# Patient Record
Sex: MALE | Race: OTHER | Employment: FULL TIME | URBAN - NONMETROPOLITAN AREA
[De-identification: names, ages, dates, MRNs, and addresses within clinical notes are randomized per-mention and may not be internally consistent; named-entity substitution may affect disease eponyms.]

---

## 2018-07-16 ENCOUNTER — HOSPITAL ENCOUNTER (OUTPATIENT)
Dept: LAB | Age: 68
Setting detail: SPECIMEN
Discharge: HOME | End: 2018-07-16
Payer: MEDICARE

## 2018-07-16 ENCOUNTER — OFFICE VISIT (OUTPATIENT)
Dept: FAMILY MEDICINE CLINIC | Age: 68
End: 2018-07-16
Payer: MEDICARE

## 2018-07-16 VITALS
WEIGHT: 169 LBS | OXYGEN SATURATION: 97 % | HEART RATE: 71 BPM | TEMPERATURE: 97.8 F | RESPIRATION RATE: 16 BRPM | SYSTOLIC BLOOD PRESSURE: 138 MMHG | BODY MASS INDEX: 24.25 KG/M2 | DIASTOLIC BLOOD PRESSURE: 80 MMHG

## 2018-07-16 DIAGNOSIS — E11.9 TYPE 2 DIABETES MELLITUS WITHOUT COMPLICATION, WITH LONG-TERM CURRENT USE OF INSULIN (HCC): Primary | ICD-10-CM

## 2018-07-16 DIAGNOSIS — Z79.4 TYPE 2 DIABETES MELLITUS WITHOUT COMPLICATION, WITH LONG-TERM CURRENT USE OF INSULIN (HCC): ICD-10-CM

## 2018-07-16 DIAGNOSIS — E11.9 TYPE 2 DIABETES MELLITUS WITHOUT COMPLICATION, WITH LONG-TERM CURRENT USE OF INSULIN (HCC): ICD-10-CM

## 2018-07-16 DIAGNOSIS — Z79.4 TYPE 2 DIABETES MELLITUS WITHOUT COMPLICATION, WITH LONG-TERM CURRENT USE OF INSULIN (HCC): Primary | ICD-10-CM

## 2018-07-16 LAB
BILIRUBIN, POC: NORMAL
BLOOD URINE, POC: NORMAL
CLARITY, POC: NORMAL
COLOR, POC: NORMAL
CREATININE URINE: 209.3 MG/DL (ref 39–259)
ESTIMATED AVERAGE GLUCOSE: 217 MG/DL
GLUCOSE URINE, POC: NORMAL
HBA1C MFR BLD: 9.2 % (ref 4.8–5.9)
KETONES, POC: NORMAL
LEUKOCYTE EST, POC: NORMAL
MICROALBUMIN/CREAT 24H UR: 315 MG/L
MICROALBUMIN/CREAT UR-RTO: 151 MCG/MG CREAT
NITRITE, POC: NORMAL
PH, POC: 6
PROTEIN, POC: NORMAL
SPECIFIC GRAVITY, POC: 1.02
UROBILINOGEN, POC: 0.2

## 2018-07-16 PROCEDURE — 82043 UR ALBUMIN QUANTITATIVE: CPT

## 2018-07-16 PROCEDURE — 1101F PT FALLS ASSESS-DOCD LE1/YR: CPT | Performed by: NURSE PRACTITIONER

## 2018-07-16 PROCEDURE — 36415 COLL VENOUS BLD VENIPUNCTURE: CPT

## 2018-07-16 PROCEDURE — 1123F ACP DISCUSS/DSCN MKR DOCD: CPT | Performed by: NURSE PRACTITIONER

## 2018-07-16 PROCEDURE — G8427 DOCREV CUR MEDS BY ELIG CLIN: HCPCS | Performed by: NURSE PRACTITIONER

## 2018-07-16 PROCEDURE — 4040F PNEUMOC VAC/ADMIN/RCVD: CPT | Performed by: NURSE PRACTITIONER

## 2018-07-16 PROCEDURE — 1036F TOBACCO NON-USER: CPT | Performed by: NURSE PRACTITIONER

## 2018-07-16 PROCEDURE — 81002 URINALYSIS NONAUTO W/O SCOPE: CPT | Performed by: NURSE PRACTITIONER

## 2018-07-16 PROCEDURE — G8420 CALC BMI NORM PARAMETERS: HCPCS | Performed by: NURSE PRACTITIONER

## 2018-07-16 PROCEDURE — 3017F COLORECTAL CA SCREEN DOC REV: CPT | Performed by: NURSE PRACTITIONER

## 2018-07-16 PROCEDURE — 2022F DILAT RTA XM EVC RTNOPTHY: CPT | Performed by: NURSE PRACTITIONER

## 2018-07-16 PROCEDURE — 83036 HEMOGLOBIN GLYCOSYLATED A1C: CPT

## 2018-07-16 PROCEDURE — 82570 ASSAY OF URINE CREATININE: CPT

## 2018-07-16 PROCEDURE — 99214 OFFICE O/P EST MOD 30 MIN: CPT | Performed by: NURSE PRACTITIONER

## 2018-07-16 PROCEDURE — 3046F HEMOGLOBIN A1C LEVEL >9.0%: CPT | Performed by: NURSE PRACTITIONER

## 2018-07-16 ASSESSMENT — ENCOUNTER SYMPTOMS
SHORTNESS OF BREATH: 0
VISUAL CHANGE: 0
CHEST TIGHTNESS: 0
BLURRED VISION: 0

## 2018-07-16 NOTE — PROGRESS NOTES
[address] [place] [address] In [address] [facility], APRN-CNP  [address]  Phone:  [phone]  Fax:  [phone]  Gretchen Montalvo is a 70 y.o. male who presents today for his medical conditions/complaints as noted below. Gretchen Montalvo c/o of Diabetes (pt says he has had some high sugars)      HPI:     Patient has limited [place] speaking ability. Able to communicate via translation of English text to [address], and my marginal Khmer speaking. No translation phone available at this time. Diabetes   He presents for his initial diabetic visit. He has type 2 diabetes mellitus. The initial diagnosis of diabetes was made 7 years ago. His disease course has been fluctuating (Has not been treated. ). Pertinent negatives for hypoglycemia include no confusion, headaches or seizures. Pertinent negatives for diabetes include no blurred vision, no chest pain, no fatigue, no foot paresthesias, no polydipsia, no polyphagia, no polyuria, no visual change, no weakness and no weight loss. Diabetic symptom progression: no treatment for the past 3 years. When asked about current treatments, none were reported. His weight is increasing steadily. When asked about meal planning, he reported none. His breakfast blood glucose range is generally >200 mg/dl. An ACE inhibitor/angiotensin II receptor blocker is not being taken.        Wt Readings from Last 3 Encounters:   07/16/18 169 lb (76.7 kg)   09/04/15 162 lb 12.8 oz (73.8 kg)   03/28/14 133 lb 6.4 oz (60.5 kg)       Temp Readings from Last 3 Encounters:   07/16/18 97.8 °F (36.6 °C) (Tympanic)   03/28/14 97.3 °F (36.3 °C) (Tympanic)   03/21/14 98.1 °F (36.7 °C) (Oral)       BP Readings from Last 3 Encounters:   07/16/18 138/80   09/04/15 138/84   03/28/14 126/60       Pulse Readings from Last 3 Encounters:   07/16/18 71   09/04/15 79   03/28/14 64              Past Medical History:   Diagnosis Date    Depression     ED (erectile dysfunction)     Elevated PSA     ESBL (extended spectrum beta-lactamase) producing bacteria infection 3/14/2014    blood and urine E. Coli    Hyperlipidemia     Hypogonadism male     Low testosterone     hx of    Mycosis     mild, feet    Proteinuria     mild    Type II or unspecified type diabetes mellitus without mention of complication, not stated as uncontrolled       Past Surgical History:   Procedure Laterality Date    HAND SURGERY Right     [facility]         PROSTATE BIOPSY       History reviewed. No pertinent family history. Social History   Substance Use Topics    Smoking status: Former Smoker    Smokeless tobacco: Never Used    Alcohol use No      Current Outpatient Prescriptions   Medication Sig Dispense Refill    Insulin Pen Needle 31G X 6 MM MISC 1 each by Does not apply route daily 100 each 3    glucose blood VI test strips (ASCENSIA AUTODISC VI;ONE TOUCH ULTRA TEST VI) strip As needed. 25 strip 12    Accu-Chek Softclix Lancets MISC Use as needed. Please give patient whichever brand his insurance covers. 100 each 3    insulin glargine (LANTUS SOLOSTAR) 100 UNIT/ML injection pen Inject 5 Units into the skin nightly (Patient taking differently: Inject 5 Units into the skin nightly ) 2 Pen 3    atorvastatin (LIPITOR) 20 MG tablet Take 1 tablet by mouth daily 90 tablet 1     No current facility-administered medications for this visit. No Known Allergies      Subjective:      Review of Systems   Constitutional: Negative for fatigue and weight loss. Eyes: Negative for blurred vision. Respiratory: Negative for chest tightness and shortness of breath. Cardiovascular: Negative for chest pain and leg swelling. Endocrine: Negative for polydipsia, polyphagia and polyuria. Neurological: Negative for seizures, weakness and headaches. Psychiatric/Behavioral: Negative for confusion.        Objective:     /80 (Site: Right Arm, Position: Sitting, Cuff Size: Medium

## 2018-07-17 ENCOUNTER — HOSPITAL ENCOUNTER (OUTPATIENT)
Dept: LAB | Age: 68
Setting detail: SPECIMEN
Discharge: HOME | End: 2018-07-17
Payer: MEDICARE

## 2018-07-17 DIAGNOSIS — Z79.4 TYPE 2 DIABETES MELLITUS WITHOUT COMPLICATION, WITH LONG-TERM CURRENT USE OF INSULIN (HCC): ICD-10-CM

## 2018-07-17 DIAGNOSIS — E11.9 TYPE 2 DIABETES MELLITUS WITHOUT COMPLICATION, WITH LONG-TERM CURRENT USE OF INSULIN (HCC): ICD-10-CM

## 2018-07-17 LAB
ALBUMIN SERPL-MCNC: 3.9 G/DL (ref 3.5–5.2)
ALBUMIN/GLOBULIN RATIO: 1 (ref 1–2.5)
ALP BLD-CCNC: 137 U/L (ref 40–129)
ALT SERPL-CCNC: 39 U/L (ref 5–41)
ANION GAP SERPL CALCULATED.3IONS-SCNC: 10 MMOL/L (ref 9–17)
AST SERPL-CCNC: 23 U/L
BILIRUB SERPL-MCNC: 1.6 MG/DL (ref 0.3–1.2)
BUN BLDV-MCNC: 19 MG/DL (ref 8–23)
BUN/CREAT BLD: 13 (ref 9–20)
CALCIUM SERPL-MCNC: 9.4 MG/DL (ref 8.6–10.4)
CHLORIDE BLD-SCNC: 101 MMOL/L (ref 98–107)
CHOLESTEROL, FASTING: 209 MG/DL
CHOLESTEROL/HDL RATIO: 7
CO2: 28 MMOL/L (ref 20–31)
CREAT SERPL-MCNC: 1.52 MG/DL (ref 0.7–1.2)
GFR AFRICAN AMERICAN: 55 ML/MIN
GFR NON-AFRICAN AMERICAN: 46 ML/MIN
GFR SERPL CREATININE-BSD FRML MDRD: ABNORMAL ML/MIN/{1.73_M2}
GFR SERPL CREATININE-BSD FRML MDRD: ABNORMAL ML/MIN/{1.73_M2}
GLUCOSE BLD-MCNC: 177 MG/DL (ref 70–99)
HDLC SERPL-MCNC: 30 MG/DL
LDL CHOLESTEROL: 151 MG/DL (ref 0–130)
POTASSIUM SERPL-SCNC: 4.9 MMOL/L (ref 3.7–5.3)
SODIUM BLD-SCNC: 139 MMOL/L (ref 135–144)
TOTAL PROTEIN: 7.8 G/DL (ref 6.4–8.3)
TRIGLYCERIDE, FASTING: 141 MG/DL
VLDLC SERPL CALC-MCNC: ABNORMAL MG/DL (ref 1–30)

## 2018-07-17 PROCEDURE — 80053 COMPREHEN METABOLIC PANEL: CPT

## 2018-07-17 PROCEDURE — 36415 COLL VENOUS BLD VENIPUNCTURE: CPT

## 2018-07-17 PROCEDURE — 80061 LIPID PANEL: CPT

## 2018-07-18 ENCOUNTER — OFFICE VISIT (OUTPATIENT)
Dept: FAMILY MEDICINE CLINIC | Age: 68
End: 2018-07-18
Payer: MEDICARE

## 2018-07-18 VITALS
SYSTOLIC BLOOD PRESSURE: 130 MMHG | DIASTOLIC BLOOD PRESSURE: 76 MMHG | HEART RATE: 68 BPM | OXYGEN SATURATION: 98 % | BODY MASS INDEX: 26.53 KG/M2 | HEIGHT: 67 IN | WEIGHT: 169 LBS

## 2018-07-18 DIAGNOSIS — E78.2 MIXED HYPERLIPIDEMIA: ICD-10-CM

## 2018-07-18 DIAGNOSIS — E11.22 TYPE 2 DIABETES MELLITUS WITH STAGE 3 CHRONIC KIDNEY DISEASE, WITH LONG-TERM CURRENT USE OF INSULIN (HCC): Primary | ICD-10-CM

## 2018-07-18 DIAGNOSIS — Z79.4 TYPE 2 DIABETES MELLITUS WITH STAGE 3 CHRONIC KIDNEY DISEASE, WITH LONG-TERM CURRENT USE OF INSULIN (HCC): Primary | ICD-10-CM

## 2018-07-18 DIAGNOSIS — N18.30 TYPE 2 DIABETES MELLITUS WITH STAGE 3 CHRONIC KIDNEY DISEASE, WITH LONG-TERM CURRENT USE OF INSULIN (HCC): Primary | ICD-10-CM

## 2018-07-18 PROCEDURE — 2022F DILAT RTA XM EVC RTNOPTHY: CPT | Performed by: NURSE PRACTITIONER

## 2018-07-18 PROCEDURE — 3046F HEMOGLOBIN A1C LEVEL >9.0%: CPT | Performed by: NURSE PRACTITIONER

## 2018-07-18 PROCEDURE — G8427 DOCREV CUR MEDS BY ELIG CLIN: HCPCS | Performed by: NURSE PRACTITIONER

## 2018-07-18 PROCEDURE — 3017F COLORECTAL CA SCREEN DOC REV: CPT | Performed by: NURSE PRACTITIONER

## 2018-07-18 PROCEDURE — 4040F PNEUMOC VAC/ADMIN/RCVD: CPT | Performed by: NURSE PRACTITIONER

## 2018-07-18 PROCEDURE — 99213 OFFICE O/P EST LOW 20 MIN: CPT | Performed by: NURSE PRACTITIONER

## 2018-07-18 PROCEDURE — 1101F PT FALLS ASSESS-DOCD LE1/YR: CPT | Performed by: NURSE PRACTITIONER

## 2018-07-18 PROCEDURE — 1036F TOBACCO NON-USER: CPT | Performed by: NURSE PRACTITIONER

## 2018-07-18 PROCEDURE — G8419 CALC BMI OUT NRM PARAM NOF/U: HCPCS | Performed by: NURSE PRACTITIONER

## 2018-07-18 PROCEDURE — 1123F ACP DISCUSS/DSCN MKR DOCD: CPT | Performed by: NURSE PRACTITIONER

## 2018-07-18 RX ORDER — GLIMEPIRIDE 2 MG/1
2 TABLET ORAL EVERY MORNING
Qty: 30 TABLET | Refills: 0 | Status: SHIPPED | OUTPATIENT
Start: 2018-07-18 | End: 2018-07-24

## 2018-07-18 ASSESSMENT — ENCOUNTER SYMPTOMS
SHORTNESS OF BREATH: 0
BLURRED VISION: 0
COLOR CHANGE: 0

## 2018-07-18 NOTE — PATIENT INSTRUCTIONS
[place] glimepirida 2 mg cada mañana. Seguimiento en 1 Amos Wong. Continúe con dasilva insulina. Tráigalo con usted cuando venga la próxima semana junto con dasilva nueva tarjeta de seguro para que podamos averiguar qué medicamentos serán cubiertos por dasilva seguro. Patient Education        Medicamentos no insulínicos para la diabetes de tipo 2: Instrucciones de cuidado - [ [facility] for Type 2 Diabetes: Care Instructions ]  Instrucciones de cuidado    Existen diferentes tipos de medicamentos no insulínicos para la diabetes. Cada tipo funciona de un modo distinto. Joyce todos ellos le ayudan a controlar el azúcar en la sebastian. Algunos tipos ayudan a que el organismo produzca insulina para reducir el azúcar en la sebastian. Otros reducen la cantidad de insulina que necesita el organismo. Algunos pueden retrasar la velocidad con la que el cuerpo digiere los azúcares. Y algunos pueden eliminar el exceso de glucosa a través de la [place]. · Inhibidores de la shandra-glucosidasa. Estos evitan que los almidones se descompongan. [place] significa que reducen la cantidad de glucosa que se absorbe cuando usted come. No ayudan al organismo a producir más insulina. Por lo tanto, no provocarán azúcar baja en la sebastian a menos que se usen con otros medicamentos para la diabetes. Incluyen la acarbosa y el miglitol. · Inhibidores de la DPP-4. Estos ayudan al organismo a elevar el nivel de insulina después de comer. También ayudan al cuerpo a producir silvana cantidad de lora hormona que eleva el azúcar en la [place]. Incluyen la linagliptina, la saxagliptina y la sitagliptina. · Hormonas incretinas (agonistas del receptor de GLP-1). El organismo produce lora proteína que puede elevar dasilva nivel de [place]. También puede bajar el azúcar en la sebastian y hacer que sienta menos [place]. Usted puede inyectarse hormonas que funcionan de la [place]. Estas incluyen la exenatida y la liraglutida. · Meglitinidas.  Estas ayudan al médico si tiene planes de [place]. Algunos de [place] no son seguros para las [address] · Sea moises con los medicamentos. [employer] medicamentos exactamente salome le fueron recetados. Las meglitinidas y las sulfonilureas pueden hacer que el azúcar en la sebastian baje [place]. Llame a hoover médico si shaila estar teniendo un problema con hoover medicamento. · Revísese los niveles de azúcar en la sebastian con frecuencia. Puede usar un monitor de glucosa. Llevar un registro puede ayudarle a saber cómo ciertos alimentos, actividades y medicamentos afectan hoover azúcar en la sebastian. Y puede ayudarle a prevenir que el azúcar en la sebastian baje a niveles peligrosos. ¿Cuándo debe pedir ayuda? Llame al 911 en cualquier momento que considere que necesita atención de [place]. Por ejemplo, llame si:    · Se desmayó (perdió el conocimiento).     · Está confuso o no puede pensar con claridad.     · Hoover nivel de azúcar en la sebastian es muy alto o muy bajo.    Preste especial atención a los cambios en hoover antoinette y asegúrese de comunicarse con hoover médico si:    · Hoover nivel de azúcar en la sebastian permanece fuera de los límites ideales que el médico reed establecido para usted.     · Tiene cualquier problema. ¿Dónde puede encontrar más información en inglés? Dennys Dk a [email or web address] e ingrese a hoover cuenta de Mark. Pablo Berry H153 en el cuadro \"Search Health Information\" para más información (en inglés) sobre \"Medicamentos no insulínicos para la diabetes de tipo 2: Instrucciones de cuidado - [ Dellia Sandman for Type 2 Diabetes: Care Instructions ]. \"     Si no tiene lora cuenta, rex aguilar en el enlace \"Sign Up Now\". Revisado: 7 dicalecmbre, 2017  Versión del contenido: 11.6  © 0386-6742 [employer], [employer]. Las instrucciones de cuidado fueron adaptadas bajo licencia por [facility] ([facility]).  Si usted tiene [place] afección médica o sobre estas instrucciones, siempre pregunte a hoover glimepiride? Siga todas las instrucciones en la etiqueta de hoover prescripción. Kip Glory hoover médico en ocasiones cambie hoover dosis para asegurarse de que está obteniendo los [employer]. No tome esta medicina en cantidades [employer], o por más tiempo de lo recomendado. Glimepiride se suele bety lora vez al día, con el desayuno o la primera comida principal del día. [address] glimepiride con un vaso de agua lleno. El nivel de azúcar en hoover sebastian necesitará ser examinado con frecuencia, y usted puede necesitar otras pruebas de sebastian en la oficina de hoover médico.  Un nivel bajo de azúcar en la sebastian (hipoglucemia) le puede suceder a cualquiera que tenga diabetes. Los síntomas incluyen dolor de [place], [place], sudoración, piel pálida, irritabilidad, [place], sentirse tembloroso, o dificultad para concentrarse. Siempre tenga con usted lora katherine de azúcar en simeon de que tenga un bajo nivel de azúcar en la sebastian. Puede obtener el azúcar de jugos de frutas, dulces, [place], pasas, y refrescos que no demetrius dietéticos. Al igual asegúrese de que hoover mauro y amigos cercanos sepan cómo ayudarlo en simeon de emergencia. Si usted tiene hipoglucemia grave y no puede comer o beber, use lora inyección de glucagón. [place] médico puede recetarle un kit de emergencia de inyección de glucagón y decirle cómo usarlo. [place] hoover nivel de azúcar en la sebastian con cuidado en momentos de tensión, viajes, enfermedad, cirugía, ó emergencia médica, ejercicio vigoroso, o si usted devante alcohol o se salta comidas. Estos eventos pueden afectar hoover nivel de glucosa y hoover dosis [place]. No cambie la dosis u horario de hoover medicamento sin el consejo de hoover médico.  Glimepiride es sólo parte de un programa de tratamiento que también puede incluir dieta, ejercicio, control de [place], [place] de azúcar regulares en la [place], y cuidado médico especializado.  Siga las instrucciones de hoover [facility] de cercaStar Cheek a temperatura ambiente fuera de la humedad y del calor. ¿Qué sucede si me richard lora dosis? [place] la dosis que dejó de bety tan pronto se acuerde. Sáltese la dosis que dejó de bety si ya kevin es hora para la siguiente dosis. No use más medicina para alcanzar la dosis que dejó de bety. Viky Remy en lora sobredosis? Busque atención médica de emergencia o llame a la línea de Poison [facility] al [phone]. Lora sobredosis de glimepiride puede causar lora hipoglucemia que puede ser peligrosa para dasilva dana. Los síntomas de hipoglucemia aguda incluyen debilidad extrema, confusión, temblores, sudoración, latido cardíaco rápido, dificultad para hablar, náusea, vómito, respiración rápida, desmayo, y convulsiones. ¿Qué gray evitar mientras maycol glimepiride? Si usted también tome colesevelam, evite tomarlo dentro de 4 horas después de bety glimepiride. Evite el uso del alcohol. Éste reduce el nivel de glucosa (azúcar) en la sebastian y puede interferir con el tratamiento de dasilva diabetes. Evite la exposición a la sakshi solar o jonathan para broncearse. Glimepiride puede hacer que se queme más fácilmente. Póngase ropa que lo proteja y use un bloqueador con filtro solar (de SPF 27 o mayor) si usted está afuera. ¿Cuáles son los efectos secundarios posibles de glimepiride? Busque atención médica de emergencia si usted tiene síntomas de [facility] reacción alérgica: ronchas; dificultad para respirar; hinchazón de la elieser, labios, lengua, o garganta. Llame a dasilva médico de inmediato si usted tiene:  · piel pálida o de color [place], [place], fiebre, confusión o debilidad; o  · reacción severa de la piel --fiebre, dolor de garganta, hinchazón en dasilva elieser o lengua, quemazón en juan ojos, dolor de la piel, seguido por un sarpullido [place] o púrpura que se extiende (especialmente en la elieser o la parte superior del cuerpo) y causa ampollas y descamación.   Efectos secundarios comunes pueden incluir:  · dolor de

## 2018-07-18 NOTE — PROGRESS NOTES
[address] [address] [address] [facility], APRN-CNP  [address]  Phone:  [phone]  Fax:  [phone]  Mayank Goel is a 70 y.o. male who presents today for his medical conditions/complaints as noted below. Mayank Goel c/o of Diabetes    Language phones do not work.  was Antonia Rodarte from Dr. Lamar Rawls office. HPI:     Diabetes   He presents for his follow-up diabetic visit. He has type 2 diabetes mellitus. The initial diagnosis of diabetes was made 7 years ago. His disease course has been worsening. There are no hypoglycemic associated symptoms. Pertinent negatives for diabetes include no blurred vision, no chest pain, no polyphagia, no polyuria and no weight loss. Symptoms are worsening. Diabetic complications include nephropathy. Risk factors for coronary artery disease include dyslipidemia, diabetes mellitus and male sex. Current diabetic treatment includes insulin injections. He is compliant with treatment most of the time. His weight is increasing steadily. His breakfast blood glucose range is generally 180-200 mg/dl. Wt Readings from Last 3 Encounters:   07/18/18 169 lb (76.7 kg)   07/16/18 169 lb (76.7 kg)   09/04/15 162 lb 12.8 oz (73.8 kg)       Temp Readings from Last 3 Encounters:   07/16/18 97.8 °F (36.6 °C) (Tympanic)   03/28/14 97.3 °F (36.3 °C) (Tympanic)   03/21/14 98.1 °F (36.7 °C) (Oral)       BP Readings from Last 3 Encounters:   07/18/18 130/76   07/16/18 138/80   09/04/15 138/84       Pulse Readings from Last 3 Encounters:   07/18/18 68   07/16/18 71   09/04/15 79              Past Medical History:   Diagnosis Date    Depression     ED (erectile dysfunction)     Elevated PSA     ESBL (extended spectrum beta-lactamase) producing bacteria infection 3/14/2014    blood and urine E.  Coli    Hyperlipidemia     Hypogonadism male     Low testosterone     hx of    Mycosis     mild, feet    Proteinuria     mild    Type II or unspecified well-developed and well-nourished. Non-toxic appearance. He does not have a sickly appearance. He does not appear ill. No distress. HENT:   Head: Normocephalic. Right Ear: Hearing and external ear normal.   Left Ear: Hearing and external ear normal.   Nose: No mucosal edema or rhinorrhea. Mouth/Throat: Uvula is midline and mucous membranes are normal. Mucous membranes are not pale and not dry. Eyes: Conjunctivae, EOM and lids are normal. Right eye exhibits no discharge. Left eye exhibits no discharge. No scleral icterus. Right eye exhibits no nystagmus. Left eye exhibits no nystagmus. Neck: Trachea normal, normal range of motion and full passive range of motion without pain. No thyroid mass present. Cardiovascular: Normal rate, regular rhythm, S1 normal, S2 normal and normal heart sounds. Pulmonary/Chest: Effort normal and breath sounds normal. No accessory muscle usage. No respiratory distress. Musculoskeletal: Normal range of motion. Neurological: He is alert and oriented to person, place, and time. Skin: Skin is warm, dry and intact. He is not diaphoretic. No pallor. Psychiatric: He has a normal mood and affect. His speech is normal and behavior is normal. Judgment and thought content normal. Cognition and memory are normal.       Assessment:      Diagnosis Orders   1. Type 2 diabetes mellitus with stage 3 chronic kidney disease, with long-term current use of insulin (HCC)  glimepiride (AMARYL) 2 MG tablet   2. Mixed hyperlipidemia       No results found for this visit on 07/18/18. Plan: Will avoid metformin initially until kidney function is improved. Take Glimepiride 2 mg each morning. Follow up in 1 week with Dr. Arie Doyle. Continue your insulin. Bring it with you when you come next week, along with your new insurance card so we can figure out which medicines will be covered by your insurance. Bring your blood sugar readings along as well.     [place] glimepirida 2 mg Porfirio Quintero. Seguimiento en 1 Carmel Genia Doyle. Continúe con dasilva insulina. Tráigalo con usted cuando venga la próxima semana junto con dasilva nueva tarjeta de seguro para que podamos averiguar qué medicamentos serán cubiertos por dasilva seguro. Return in about 1 week (around 7/25/2018) for diabetes, Dr. Arie Doyle. Patient Instructions     Luyando glimepirida 2 mg Porfirio Quintero. Seguimiento en 1 Carmel Genia Doyle. Continúe con dasilva insulina. Tráigalo con usted cuando venga la próxima semana junto con dasilva nueva tarjeta de seguro para que podamos averiguar qué medicamentos serán cubiertos por dasilva seguro. Patient Education        Medicamentos no insulínicos para la diabetes de tipo 2: Instrucciones de cuidado - [ Celsa Punchtraci for Type 2 Diabetes: Care Instructions ]  Instrucciones de cuidado    Existen diferentes tipos de medicamentos no insulínicos para la diabetes. Cada tipo funciona de un modo distinto. Joyce todos ellos le ayudan a controlar el azúcar en la sebastian. Algunos tipos ayudan a que el organismo produzca insulina para reducir el azúcar en la sebastian. Otros reducen la cantidad de insulina que necesita el organismo. Algunos pueden retrasar la velocidad con la que el cuerpo digiere los azúcares. Y algunos pueden eliminar el exceso de glucosa a través de la [place]. · Inhibidores de la shandra-glucosidasa. Estos evitan que los almidones se descompongan. [place] significa que reducen la cantidad de glucosa que se absorbe cuando usted come. No ayudan al organismo a producir más insulina. Por lo tanto, no provocarán azúcar baja en la sebastian a menos que se usen con otros medicamentos para la diabetes. Incluyen la acarbosa y el miglitol. · Inhibidores de la DPP-4. Estos ayudan al organismo a elevar el nivel de insulina después de comer. También ayudan al cuerpo a producir silvana cantidad de lora hormona que eleva el azúcar en la [place]. Incluyen la linagliptina, la saxagliptina y la sitagliptina.   · Hormonas incretinas (agonistas del receptor de GLP-1). El organismo produce lora proteína que puede elevar dasilva nivel de [place]. También puede bajar el azúcar en la sebastian y hacer que sienta menos [place]. Usted puede inyectarse hormonas que funcionan de la [place]. Estas incluyen la exenatida y la liraglutida. · Meglitinidas. Estas ayudan al organismo a liberar insulina. También ayudan a retrasar cómo digiere los azúcares el organismo. Así que pueden evitar que el azúcar en la sebastian se eleve demasiado rápido después de comer. Incluyen la nateglinida y la repaglinida. · Metformina. Esta reduce la cantidad de glucosa que produce el hígado. Y le ayuda a responder mejor a la insulina. También baja la cantidad de azúcar almacenada que libera el hígado cuando no está comiendo. · Inhibidores del SGLT2. Estos ayudan a eliminar el exceso de glucosa a través de la [place]. También podrían ayudar a [employer] a bajar de peso. Incluyen la canagliflozina, la dapagliflozina y la empagliflozina. · Sulfonilureas. Estas ayudan al organismo a liberar más insulina. Algunas funcionan por muchas horas. Pueden provocar azúcar baja en la sebastian si usted no come salome esperaba. Incluyen la glipizida y la gliburida. · Tiazolidinedionas. Estas reducen la cantidad de glucosa en la sebastian. También le ayudan a responder mejor a la insulina. Koffi Fusi y la rosiglitazona. Ole vez tenga que bety más de un medicamento para la diabetes. Dos o más medicamentos podrían funcionar mejor para bajar el nivel de azúcar en la sebastian que un medicamento solo. La atención de seguimiento es lora parte clave de dasilva tratamiento y seguridad. Asegúrese de hacer y acudir a todas las citas, y llame a dasilva médico si está teniendo problemas. También es lora buena idea saber los resultados de juan exámenes y mantener lora lista de los medicamentos que tae. ¿Cómo puede cuidarse en el hogar? · Siga lora dieta saludable.  Gerri Rily de ejercicio todos los piel --fiebre, dolor de garganta, hinchazón en dasilva elieser o lengua, quemazón en juan ojos, dolor de la piel, seguido por un sarpullido holland o púrpura que se extiende (especialmente en la elieser o la parte superior del cuerpo) y causa ampollas y descamación. Efectos secundarios comunes pueden incluir:  · dolor de [place];  · mareo, debilidad;  · náusea; o  · síntomas de la gripe. Esta lista no menciona todos los efectos secundarios y puede ser que ocurran otros. Llame a dasilva médico para consejos médicos relacionados a efectos secundarios. Usted puede reportar efectos secundarios llamando al FDA al 1-800-FDA-1087. ¿Qué otras drogas afectarán a glimepiride? Es más probable que tenga hipoglucemia (nivel bajo de azúcar en la sebastian) si usted está tomando glimepiride con otras drogas que pueden bajar el azúcar en sebastian, salome:  · exenatide ([place]);  · probenecid (Benemid);  · aspirin u otros salicilatos (que incluyen Pepto Bismol);  · un anticoagulante (warfarin, Coumadin, Jantoven);  · sulfas (Bactrim, SMZ-TMP, y otras);  · un inhibidor de la monoaminooxidasa (IMAO); o  · insulin u otros medicamentos antidiabéticos orales. Esta lista no está completa y existen muchas otras medicinas que pueden aumentar o disminuir los efectos de glimepiride para bajar los niveles de azúcar en dasilva sebastian. [place] incluye medicinas que se obtienen con o sin receta, vitaminas, y productos herbarios. Dígale a dasilva médico acerca de todas las medicinas que usted Britt, comience a usar, o deje de usar anson dasilva tratamiento con glimepiride. No todas las interacciones posibles aparecen en esta guía del medicamento. ¿Dónde puedo obtener más información? Dasilva farmacéutico le puede minh más información acerca de glimepiride. Recuerde, Hugo y todas las otras medicinas fuera del alcance de los niños, no comparta nunca juan medicinas con otros, y use Locaid sólo para la condición por la que fue recetada.   Se reed hecho todo lo posible para que la información que proviene de [address] sea precisa, actual, y Jessica Leslie no se hace garantía de no. La información sobre el medicamento incluida aquí puede tener nuevas recomendaciones. La información preparada por [employer] se ha creado para uso del profesional de la antoinette y para el consumidor en los [place] y por lo cual [employer] no certifica que el uso fuera de los [place] sea apropiado, a menos que se mencione específicamente lo cual. La información de [employer] sobre drogas no sanciona drogas, ni diagnóstica al paciente o recomienda terapia. La información de [employer] sobre drogas sirve salome lora katherine de información diseñada para la ayuda del profesional de la antoinette licenciado en el cuidado de juan pacientes y/o para servir al consumidor que reciba orlando servicio salome un suplemento a, y no salome sustituto de la competencia, experiencia, conocimiento y opinión del profesional de la antoinette. La ausencia en éste de lora advertencia para lora droga o combinación de drogas no debe, de ninguna forma, interpretarse salome que la droga o la combinación de drogas demetrius seguras, [place], o apropiadas para cualquier paciente. [employer] no se responsabiliza por ningún aspecto del cuidado médico que reciba con la ayuda de la información que proviene de [place]. La información incluida aquí no se ha creado con la intención de cubrir todos los usos posibles, instrucciones, precauciones, advertencias, interacciones con otras drogas, reacciones alérgicas, o efectos secundarios. Si usted tiene alguna pregunta acerca de las drogas que está tomando, consulte con dasilva médico, [place], o farmacéutico.  Copyright 6551-0884 [employer] Version: 10.03. Revision date: 12/2/2015. Las instrucciones de cuidado fueron adaptadas bajo licencia por [facility] ([facility]). Si usted tiene [place] afección médica o sobre estas instrucciones, siempre pregunte a dasilva profesional de antoinette. Healthwise, Incorporated niega toda garantía o responsabilidad por dasilva uso de esta información. Discussed use, benefit, and side effects of prescribed medications. All patient, parent, or caregiver questions answered. Patient/parent/caregiver voiced understanding. Reviewed health maintenance. Instructed to continue current medications, diet and exercise. Patient agreed with treatment plan. Follow up as directed.            Electronically signed by ELEUTERIO Nick CNP on 7/18/2018

## 2018-07-24 ENCOUNTER — OFFICE VISIT (OUTPATIENT)
Dept: FAMILY MEDICINE CLINIC | Age: 68
End: 2018-07-24
Payer: MEDICARE

## 2018-07-24 VITALS
HEART RATE: 70 BPM | SYSTOLIC BLOOD PRESSURE: 136 MMHG | OXYGEN SATURATION: 98 % | WEIGHT: 168.6 LBS | BODY MASS INDEX: 26.81 KG/M2 | DIASTOLIC BLOOD PRESSURE: 74 MMHG

## 2018-07-24 DIAGNOSIS — E11.22 TYPE 2 DIABETES MELLITUS WITH STAGE 3 CHRONIC KIDNEY DISEASE, WITH LONG-TERM CURRENT USE OF INSULIN (HCC): Primary | ICD-10-CM

## 2018-07-24 DIAGNOSIS — Z79.4 TYPE 2 DIABETES MELLITUS WITH STAGE 3 CHRONIC KIDNEY DISEASE, WITH LONG-TERM CURRENT USE OF INSULIN (HCC): Primary | ICD-10-CM

## 2018-07-24 DIAGNOSIS — E78.49 OTHER HYPERLIPIDEMIA: ICD-10-CM

## 2018-07-24 DIAGNOSIS — N18.30 TYPE 2 DIABETES MELLITUS WITH STAGE 3 CHRONIC KIDNEY DISEASE, WITH LONG-TERM CURRENT USE OF INSULIN (HCC): Primary | ICD-10-CM

## 2018-07-24 PROCEDURE — 3017F COLORECTAL CA SCREEN DOC REV: CPT | Performed by: FAMILY MEDICINE

## 2018-07-24 PROCEDURE — G8427 DOCREV CUR MEDS BY ELIG CLIN: HCPCS | Performed by: FAMILY MEDICINE

## 2018-07-24 PROCEDURE — 1101F PT FALLS ASSESS-DOCD LE1/YR: CPT | Performed by: FAMILY MEDICINE

## 2018-07-24 PROCEDURE — 3046F HEMOGLOBIN A1C LEVEL >9.0%: CPT | Performed by: FAMILY MEDICINE

## 2018-07-24 PROCEDURE — G8419 CALC BMI OUT NRM PARAM NOF/U: HCPCS | Performed by: FAMILY MEDICINE

## 2018-07-24 PROCEDURE — 1036F TOBACCO NON-USER: CPT | Performed by: FAMILY MEDICINE

## 2018-07-24 PROCEDURE — 99213 OFFICE O/P EST LOW 20 MIN: CPT | Performed by: FAMILY MEDICINE

## 2018-07-24 PROCEDURE — 1123F ACP DISCUSS/DSCN MKR DOCD: CPT | Performed by: FAMILY MEDICINE

## 2018-07-24 PROCEDURE — 4040F PNEUMOC VAC/ADMIN/RCVD: CPT | Performed by: FAMILY MEDICINE

## 2018-07-24 PROCEDURE — 2022F DILAT RTA XM EVC RTNOPTHY: CPT | Performed by: FAMILY MEDICINE

## 2018-07-24 NOTE — PATIENT INSTRUCTIONS
boniato); 1 taza de calabacín; ½ papa horneada pequeña; ½ taza de frijoles cocidos; o ½ taza de maíz (elote) o arvejas (chícharos) cocidos. · Aprenda cuántos carbohidratos debe consumir cada día y en cada comida. Un dietista o CDE le puede enseñar cómo llevar la cuenta de los carbohidratos que consume. A esto se le llama recuento de carbohidratos. · Si no está seguro de cómo [place] gramos de carbohidratos, utilice el Método del [place] para planificar las comidas. Es lora Violeta Lei y rápida de asegurarse de que consuma comidas equilibradas. También le ayuda a distribuir los carbohidratos anson el día. ¨ Divida el plato por tipo de alimento. Llene medio plato con verduras sin almidón, ponga carne u otras proteínas en lora cuarta parte del plato y granos o verduras con almidón en el último cuarto del plato. A esto puede agregarle un pequeño pedazo de fruta y 3 taza de [place] o yogur, según la cantidad de carbohidratos que deba consumir en lora comida. · Trate de comer aproximadamente la misma cantidad de carbohidratos en cada comida. No \"reserve\" dasilva cantidad diaria de carbohidratos para consumirlos en lora nadeen comida. · Las proteínas contienen muy pocos o nada de carbohidratos por porción. Los ejemplos de proteínas incluyen carne de res, [place], [place], [place], [place], tofu, [place], requesón (\"cottage cheese\") y la mantequilla de cacahuate [place]). Lora porción de carne son 3 onzas (85 g), lo cual es aproximadamente del tamaño de lora baraja de naipes. Los ejemplos de porciones de sustitutos de la carne (equivalente a 1 onza o 28 g de carne) son 1/4 de taza de requesón, 1 huevo, 1 cucharada de [place] de cacahuate y ½ taza de tofu. ¿Cómo puede comer fuera y aún así comer de modo saludable? · Aprenda a calcular los tamaños de las porciones de alimentos que contienen carbohidratos. Si mide la comida en casa, será más fácil calcular la cantidad en lora porción de comida de restaurante.   · Si el platillo que pide ronchas dolorosas. Algunos efectos secundarios de los ARB incluyen:  · Diarrea. · Niveles altos de potasio. · Problemas de los senos paranasales. · Problemas estomacales. Usted puede tener otros efectos secundarios o reacciones que no se enumeran aquí. Revise la información que viene con dasilva medicamento. Qué debe saber acerca de naomi estos medicamentos  · Sea moises con los medicamentos. [place] juan medicamentos exactamente salome se los recetaron. Llame a dasilva médico si piensa que está teniendo un problema con dasilva medicamento. · Antes de comenzar a naomi un inhibidor de la ECA o un ARB, dígale a dasilva médico si:  ¨ Utiliza un sustituto de la sal.  ¨ Tae diuréticos o pastillas de potasio. · Estos medicamentos no son seguros para el [place]. Si usted está embarazada o planea quedar embarazada, hable con dasilva médico acerca de un medicamento para la presión arterial que sea seguro. · Los inhibidores de la ECA pueden provocar lora tos seca. Si la tos es [place], hable con dasilva médico. Es probable que cambiar a un ARB pueda ayudar. · Naomi algunos medicamentos juntos puede causar problemas. Informe a dasilva médico o farmacéutico acerca de todos los medicamentos que tae. [place] incluye medicamentos de venta maik, vitaminas, productos herbarios y suplementos. · Es posible que necesite análisis regulares de [facility] y [facility]. ¿Dónde puede encontrar más información en [facility]? Varun Shan a [email or web address] e ingrese a dasilva cuenta de MyChart. Raisa Esteban M316 en el cuadro \"Search Health Information\" para más información (en inglés) sobre \"Aprenda acerca de los inhibidores de la ECA y los ARB para la diabetes - [ Learning About ACE Inhibitors and ARBs for Diabetes ]. \"     Si no tiene lora cuenta, rex aguilar en el enlace \"Sign Up Now\". Revisado: 7 christianmbre, 2017  Versión del contenido: 11.6  © 7944-0719 [employer]. Las instrucciones de cuidado fueron adaptadas bajo licencia por [facility] ([facility]).  Si usted tiene medicamentos para controlar la presión arterial o tratar la diabetes. Muchas personas con diabetes samanta medicamentos para la presión arterial.     · Si tiene diabetes, rex lo que pueda para mantener el azúcar en la sebastian dentro de los límites recomendados para usted. Puede hacerlo comiendo alimentos saludables y haciendo ejercicio. También podría bety medicamentos.     · Acuda a juan citas de diálisis si le están haciendo [employer].     · No tome ibuprofeno (Advil, Motrin), naproxeno (Aleve) o medicamentos similares a menos que dasilva médico se lo indique. Estos pueden empeorar la enfermedad.     · No tome vitaminas, medicamentos de venta maik ni productos herbarios sin consultar ila con dasilva médico.     · No fume ni use otros productos derivados del tabaco. Fumar puede reducir el flujo de sebastian a los riñones. Si necesita ayuda para dejar de fumar, hable con dasilva médico sobre programas y medicamentos para dejar de fumar. Estos pueden aumentar juan probabilidades de dejar el hábito para siempre.     · No les alcohol ni use drogas ilegales.     · Hable con dasilva médico acerca de un plan de ejercicio. El ejercicio ayuda a reducir la presión arterial. También hace que se sienta mejor.     · Si tiene instrucciones médicas por anticipado, hágaselo saber a dasilva médico. Pueden incluir un testamento vital y un poder legal permanente para la atención médica. Si todavía no tiene estos documentos, podría convenirle prepararlos. De orlando [facility] [place] médico y juan seres queridos conocerán juan deseos de atención médica en [place]on de que usted sea incapaz de hablar por sí mismo. Alimentación    · Consulte a un dietista registrado. [place] puede ayudarle a elaborar un plan de comidas adecuado para usted. La mayoría de las personas con enfermedad renal necesitan limitar el consumo de sal (sodio), líquidos y proteínas.  Algunas personas también necesitan limitar el potasio y el fósforo.     · Es posible que tenga que dejar de comer muchos [place] carne o sustitutos de carne en un cuarto del plato. Ponga lora verdura con almidón o un grano (salome arroz integral o lora papa) en el último cuarto del plato. Puede agregar [place] pequeña pieza de fruta y [address] o yogur descremado o semidescremado, dependiendo de dasilva meta de carbohidratos para cada comida. Estos son algunos consejos para usar el formato de plato:  · Asegúrese de no estar usando un plato demasiado emily. Lo mejor es usar un plato de 9 pulgadas (23 cm). Muchos restaurantes usan platos más grandes. · Acostúmbrese a usar el formato de plato en casa. De artie modo puede luego usarlo cuando coma afuera. · Anote las preguntas que tenga acerca de usar el formato de [place]. Hable con dasilva médico, dietista o educador en diabetes sobre juan inquietudes. Recuento de carbohidratos  Con el recuento de carbohidratos, usted planifica las comidas de acuerdo a la cantidad de carbohidratos en cada alimento. Los carbohidratos aumentan el nivel de azúcar en la [place] y con mayor rapidez que otros nutrientes. Se encuentran en postres, panes y cereales y en la fruta. También se encuentran en las verduras con almidón, tales salome las timo y [place], los granos salome el arroz y la pasta, así salome en la [place] y el yogur. Distribuir el consumo de carbohidratos a lo johann del día le ayuda a mantener el azúcar en la sebastian en los límites ideales. Dasilva cantidad diaria depende de varios factores, incluyendo dasilva peso, nivel de [place], los [place] tae para la diabetes y los objetivos que tenga para juan niveles de azúcar en la sebastian. Un dietista registrado o un educador en diabetes puede ayudarle a planear cuántos carbohidratos incluir en cada comida y refrigerio. Lora guía para la cantidad diaria de carbohidratos es:  · Entre 39 y 61 gramos en cada comida. Eso equivale a 3 o 4 porciones de carbohidratos, aproximadamente. · Entre 15 y 21 gramos para cada refrigerio.  Eso equivale a 1 porción de carbohidratos,

## 2018-07-24 NOTE — PROGRESS NOTES
Subjective:      Patient ID: Moriah Chavez is a 70 y.o. male. Last three years has been in [place]  hga1c 9.2  Has renal dysfunction and problems with protein dumping  Lipids elevated  Did not start amaryl        Diabetes   He presents for his follow-up diabetic visit. He has type 2 diabetes mellitus. No MedicAlert identification noted. There are no hypoglycemic associated symptoms. There are no diabetic associated symptoms. There are no hypoglycemic complications. Diabetic complications include nephropathy. Risk factors for coronary artery disease include dyslipidemia and diabetes mellitus. Current diabetic treatment includes insulin injections. He is compliant with treatment some of the time. His weight is stable. He participates in exercise every other day. An ACE inhibitor/angiotensin II receptor blocker is not being taken. He does not see a podiatrist.Eye exam is not current. Review of Systems   All other systems reviewed and are negative. Objective:   Physical Exam   Constitutional: He appears well-developed and well-nourished. HENT:   Head: Normocephalic and atraumatic. Right Ear: External ear normal.   Left Ear: External ear normal.   Nose: Nose normal.   Mouth/Throat: Oropharynx is clear and moist.   Eyes: EOM are normal. Pupils are equal, round, and reactive to light. Neck: No thyromegaly present. Cardiovascular: Normal rate and regular rhythm. Pulmonary/Chest: Effort normal and breath sounds normal.   Abdominal: Soft. There is no tenderness. Musculoskeletal: He exhibits no edema. Neurological: He is alert. Skin: Skin is warm. Nursing note and vitals reviewed.     Visual inspection:  Deformity/amputation: absent  Skin lesions/pre-ulcerative calluses: absent  Edema: right- negative, left- negative    Sensory exam:  Monofilament sensation: normal  (minimum of 5 random plantar locations tested, avoiding callused areas - > 1 area with absence of sensation is + for

## 2018-07-26 ENCOUNTER — TELEPHONE (OUTPATIENT)
Dept: FAMILY MEDICINE CLINIC | Age: 68
End: 2018-07-26

## 2018-07-26 NOTE — TELEPHONE ENCOUNTER
Phone [phone] [facility] they were concerned about his care- trying to get a job and had not been following with anyone about his labs and DM/BP - would like labs and copy of last note to see if they can clear him.  Aware GG out till next week-Fax [phone]

## 2018-07-26 NOTE — TELEPHONE ENCOUNTER
Stopped in at window with attached paper for [facility]  at [phone].

## 2018-11-27 ENCOUNTER — OFFICE VISIT (OUTPATIENT)
Dept: FAMILY MEDICINE CLINIC | Age: 68
End: 2018-11-27
Payer: COMMERCIAL

## 2018-11-27 ENCOUNTER — HOSPITAL ENCOUNTER (OUTPATIENT)
Dept: LAB | Age: 68
Discharge: HOME | End: 2018-11-27
Payer: COMMERCIAL

## 2018-11-27 ENCOUNTER — TELEPHONE (OUTPATIENT)
Dept: FAMILY MEDICINE CLINIC | Age: 68
End: 2018-11-27

## 2018-11-27 VITALS
DIASTOLIC BLOOD PRESSURE: 62 MMHG | HEART RATE: 65 BPM | SYSTOLIC BLOOD PRESSURE: 140 MMHG | OXYGEN SATURATION: 98 % | BODY MASS INDEX: 25.91 KG/M2 | WEIGHT: 163 LBS

## 2018-11-27 DIAGNOSIS — Z76.89 ENCOUNTER TO ESTABLISH CARE WITH NEW DOCTOR: ICD-10-CM

## 2018-11-27 DIAGNOSIS — Z79.4 TYPE 2 DIABETES MELLITUS WITH DIABETIC NEPHROPATHY, WITH LONG-TERM CURRENT USE OF INSULIN (HCC): ICD-10-CM

## 2018-11-27 DIAGNOSIS — Z13.21 ENCOUNTER FOR VITAMIN DEFICIENCY SCREENING: ICD-10-CM

## 2018-11-27 DIAGNOSIS — Z79.4 TYPE 2 DIABETES MELLITUS WITH DIABETIC NEPHROPATHY, WITH LONG-TERM CURRENT USE OF INSULIN (HCC): Primary | ICD-10-CM

## 2018-11-27 DIAGNOSIS — E11.21 TYPE 2 DIABETES MELLITUS WITH DIABETIC NEPHROPATHY, WITH LONG-TERM CURRENT USE OF INSULIN (HCC): Primary | ICD-10-CM

## 2018-11-27 DIAGNOSIS — E11.21 TYPE 2 DIABETES MELLITUS WITH DIABETIC NEPHROPATHY, WITH LONG-TERM CURRENT USE OF INSULIN (HCC): ICD-10-CM

## 2018-11-27 DIAGNOSIS — Z00.00 ROUTINE HEALTH MAINTENANCE: ICD-10-CM

## 2018-11-27 DIAGNOSIS — E11.9 DIABETIC EYE EXAM (HCC): ICD-10-CM

## 2018-11-27 DIAGNOSIS — Z01.00 DIABETIC EYE EXAM (HCC): ICD-10-CM

## 2018-11-27 LAB
ABSOLUTE EOS #: 0.1 K/UL (ref 0–0.4)
ABSOLUTE IMMATURE GRANULOCYTE: NORMAL K/UL (ref 0–0.3)
ABSOLUTE LYMPH #: 1.2 K/UL (ref 1–4.8)
ABSOLUTE MONO #: 0.6 K/UL (ref 0.1–1.2)
ALBUMIN SERPL-MCNC: 3.9 G/DL (ref 3.5–5.2)
ALBUMIN/GLOBULIN RATIO: 1 (ref 1–2.5)
ALP BLD-CCNC: 123 U/L (ref 40–129)
ALT SERPL-CCNC: 29 U/L (ref 5–41)
ANION GAP SERPL CALCULATED.3IONS-SCNC: 10 MMOL/L (ref 9–17)
AST SERPL-CCNC: 17 U/L
BASOPHILS # BLD: 1 % (ref 0–2)
BASOPHILS ABSOLUTE: 0 K/UL (ref 0–0.2)
BILIRUB SERPL-MCNC: 1.19 MG/DL (ref 0.3–1.2)
BUN BLDV-MCNC: 27 MG/DL (ref 8–23)
BUN/CREAT BLD: 19 (ref 9–20)
CALCIUM SERPL-MCNC: 9.4 MG/DL (ref 8.6–10.4)
CHLORIDE BLD-SCNC: 104 MMOL/L (ref 98–107)
CHOLESTEROL/HDL RATIO: 5.1
CHOLESTEROL: 175 MG/DL
CO2: 26 MMOL/L (ref 20–31)
CREAT SERPL-MCNC: 1.4 MG/DL (ref 0.7–1.2)
DIFFERENTIAL TYPE: NORMAL
EOSINOPHILS RELATIVE PERCENT: 2 % (ref 1–8)
ESTIMATED AVERAGE GLUCOSE: 203 MG/DL
GFR AFRICAN AMERICAN: >60 ML/MIN
GFR NON-AFRICAN AMERICAN: 50 ML/MIN
GFR SERPL CREATININE-BSD FRML MDRD: ABNORMAL ML/MIN/{1.73_M2}
GFR SERPL CREATININE-BSD FRML MDRD: ABNORMAL ML/MIN/{1.73_M2}
GLUCOSE BLD-MCNC: 188 MG/DL (ref 70–99)
HBA1C MFR BLD: 8.7 % (ref 4.8–5.9)
HCT VFR BLD CALC: 44.8 % (ref 41–53)
HDLC SERPL-MCNC: 34 MG/DL
HEMOGLOBIN: 14.8 G/DL (ref 13.5–17.5)
IMMATURE GRANULOCYTES: NORMAL %
LDL CHOLESTEROL: 115 MG/DL (ref 0–130)
LYMPHOCYTES # BLD: 22 % (ref 15–43)
MCH RBC QN AUTO: 30.3 PG (ref 26–34)
MCHC RBC AUTO-ENTMCNC: 33 G/DL (ref 31–37)
MCV RBC AUTO: 91.7 FL (ref 80–100)
MONOCYTES # BLD: 11 % (ref 6–14)
NRBC AUTOMATED: NORMAL PER 100 WBC
PDW BLD-RTO: 13.1 % (ref 11–14.5)
PLATELET # BLD: 190 K/UL (ref 140–450)
PLATELET ESTIMATE: NORMAL
PMV BLD AUTO: 10.4 FL (ref 6–12)
POTASSIUM SERPL-SCNC: 4.2 MMOL/L (ref 3.7–5.3)
RBC # BLD: 4.89 M/UL (ref 4.5–5.9)
RBC # BLD: NORMAL 10*6/UL
SEG NEUTROPHILS: 64 % (ref 44–74)
SEGMENTED NEUTROPHILS ABSOLUTE COUNT: 3.5 K/UL (ref 1.8–7.7)
SODIUM BLD-SCNC: 140 MMOL/L (ref 135–144)
THYROXINE, FREE: 1.23 NG/DL (ref 0.93–1.7)
TOTAL PROTEIN: 7.7 G/DL (ref 6.4–8.3)
TRIGL SERPL-MCNC: 131 MG/DL
TSH SERPL DL<=0.05 MIU/L-ACNC: 2.91 MIU/L (ref 0.3–5)
VLDLC SERPL CALC-MCNC: ABNORMAL MG/DL (ref 1–30)
WBC # BLD: 5.4 K/UL (ref 3.5–11)
WBC # BLD: NORMAL 10*3/UL

## 2018-11-27 PROCEDURE — 80053 COMPREHEN METABOLIC PANEL: CPT

## 2018-11-27 PROCEDURE — 36415 COLL VENOUS BLD VENIPUNCTURE: CPT

## 2018-11-27 PROCEDURE — 83036 HEMOGLOBIN GLYCOSYLATED A1C: CPT

## 2018-11-27 PROCEDURE — G8484 FLU IMMUNIZE NO ADMIN: HCPCS | Performed by: NURSE PRACTITIONER

## 2018-11-27 PROCEDURE — 84439 ASSAY OF FREE THYROXINE: CPT

## 2018-11-27 PROCEDURE — 85025 COMPLETE CBC W/AUTO DIFF WBC: CPT

## 2018-11-27 PROCEDURE — 80061 LIPID PANEL: CPT

## 2018-11-27 PROCEDURE — 84443 ASSAY THYROID STIM HORMONE: CPT

## 2018-11-27 PROCEDURE — 99387 INIT PM E/M NEW PAT 65+ YRS: CPT | Performed by: NURSE PRACTITIONER

## 2018-11-27 RX ORDER — LANCETS
EACH MISCELLANEOUS
Qty: 100 EACH | Refills: 3 | Status: SHIPPED | OUTPATIENT
Start: 2018-11-27

## 2018-11-27 RX ORDER — LISINOPRIL 5 MG/1
5 TABLET ORAL DAILY
Qty: 30 TABLET | Refills: 5 | Status: SHIPPED | OUTPATIENT
Start: 2018-11-27 | End: 2019-02-28 | Stop reason: SDUPTHER

## 2018-11-27 ASSESSMENT — PATIENT HEALTH QUESTIONNAIRE - PHQ9
2. FEELING DOWN, DEPRESSED OR HOPELESS: 0
SUM OF ALL RESPONSES TO PHQ QUESTIONS 1-9: 0
SUM OF ALL RESPONSES TO PHQ QUESTIONS 1-9: 0
1. LITTLE INTEREST OR PLEASURE IN DOING THINGS: 0
SUM OF ALL RESPONSES TO PHQ9 QUESTIONS 1 & 2: 0

## 2018-11-27 ASSESSMENT — ENCOUNTER SYMPTOMS
TROUBLE SWALLOWING: 0
SINUS PRESSURE: 0
CHEST TIGHTNESS: 0
VOMITING: 0
ALLERGIC/IMMUNOLOGIC NEGATIVE: 1
SHORTNESS OF BREATH: 0
CONSTIPATION: 0
ABDOMINAL PAIN: 0
COUGH: 0
EYES NEGATIVE: 1
NAUSEA: 0
DIARRHEA: 0

## 2018-11-27 NOTE — PROGRESS NOTES
2018    Micki Lara (:  1950) is a 70 y.o. male, here for evaluation of the following medical concerns: to est.     [facility]  Patient needs telephone  for this encounter. Patient has a history of diabetes and takes amaryl and Lantus. Although it looks like he has been out. He is agreeable to some lab work. His last labs were done in July of this year. He states that he had a colonoscopy three years ago and it was normal. We will get those records. He checks his blood sugars every third day. They run around 150-200. He needs diabetic foot and retinal exam. He does wear reading glasses. Review of Systems   Constitutional: Negative for activity change, appetite change and fever. HENT: Negative for congestion, ear pain, sinus pressure and trouble swallowing. Eyes: Negative. Wears glasses for reading   Respiratory: Negative for cough, chest tightness and shortness of breath. Cardiovascular: Negative for chest pain and palpitations. Gastrointestinal: Negative for abdominal pain, constipation, diarrhea, nausea and vomiting. Endocrine: Negative. Genitourinary: Negative for difficulty urinating and dysuria. Musculoskeletal: Negative. Skin: Negative. Allergic/Immunologic: Negative. Neurological: Negative for dizziness, light-headedness and headaches. Hematological: Negative. Psychiatric/Behavioral: Negative. Prior to Visit Medications    Medication Sig Taking? Authorizing Provider   insulin glargine (LANTUS SOLOSTAR) 100 UNIT/ML injection pen Inject 20 Units into the skin nightly Yes ELEUTERIO Desir CNP   Insulin Pen Needle 31G X 6 MM MISC 1 each by Does not apply route daily Yes ELEUTERIO Desir CNP   blood glucose test strips (ASCENSIA AUTODISC VI;ONE TOUCH ULTRA TEST VI) strip As needed. Yes ELEUTERIO Scott CNP   ACCU-CHEK SOFTCLIX LANCETS MISC Use as needed.  Please give patient whichever brand his oropharynx is clear and moist and mucous membranes are normal.   Eyes: Pupils are equal, round, and reactive to light. Conjunctivae and EOM are normal.   Neck: Normal range of motion. Neck supple. Cardiovascular: Normal rate, regular rhythm, normal heart sounds and intact distal pulses. Pulmonary/Chest: Effort normal and breath sounds normal.   Abdominal: Soft. Bowel sounds are normal.   Musculoskeletal: Normal range of motion. Neurological: He is alert and oriented to person, place, and time. Skin: Skin is warm and dry. Psychiatric: He has a normal mood and affect. His behavior is normal. Judgment and thought content normal.   Nursing note and vitals reviewed. ASSESSMENT/PLAN:  1. Routine health maintenance      2. Encounter to establish care with new doctor    - Lipid Panel; Future  - Hemoglobin A1C; Future  - Comprehensive Metabolic Panel; Future  - CBC Auto Differential; Future  - Vitamin D 25 Hydroxy; Future  - TSH without Reflex; Future  - T4, Free; Future    3. Type 2 diabetes mellitus with diabetic nephropathy, with long-term current use of insulin (HCC)-chronic unstable    - insulin glargine (LANTUS SOLOSTAR) 100 UNIT/ML injection pen; Inject 20 Units into the skin nightly  Dispense: 4 pen; Refill: 5  - Insulin Pen Needle 31G X 6 MM MISC; 1 each by Does not apply route daily  Dispense: 100 each; Refill: 3  - blood glucose test strips (ASCENSIA AUTODISC VI;ONE TOUCH ULTRA TEST VI) strip; As needed. Dispense: 100 strip; Refill: 3  - ACCU-CHEK SOFTCLIX LANCETS MISC; Use as needed. Please give patient whichever brand his insurance covers. Dispense: 100 each; Refill: 3  - lisinopril (PRINIVIL;ZESTRIL) 5 MG tablet; Take 1 tablet by mouth daily  Dispense: 30 tablet; Refill: 5    4. Diabetic eye exam [facility])    - [address], [place], [place], [address] [address]    Will call with all results and treat if needed.  He states that there is someone at home who speaks english to talk to for results of his lab

## 2019-01-03 ENCOUNTER — OFFICE VISIT (OUTPATIENT)
Dept: OPTOMETRY | Age: 69
End: 2019-01-03
Payer: COMMERCIAL

## 2019-01-03 DIAGNOSIS — H53.8 BLURRED VISION, BILATERAL: ICD-10-CM

## 2019-01-03 DIAGNOSIS — H26.9 CORTICAL CATARACT OF BOTH EYES: ICD-10-CM

## 2019-01-03 DIAGNOSIS — E11.39 DIABETIC OCULOPATHY (HCC): ICD-10-CM

## 2019-01-03 PROCEDURE — 1036F TOBACCO NON-USER: CPT | Performed by: OPTOMETRIST

## 2019-01-03 PROCEDURE — 99203 OFFICE O/P NEW LOW 30 MIN: CPT | Performed by: OPTOMETRIST

## 2019-01-03 PROCEDURE — G8419 CALC BMI OUT NRM PARAM NOF/U: HCPCS | Performed by: OPTOMETRIST

## 2019-01-03 PROCEDURE — 1123F ACP DISCUSS/DSCN MKR DOCD: CPT | Performed by: OPTOMETRIST

## 2019-01-03 PROCEDURE — 92250 FUNDUS PHOTOGRAPHY W/I&R: CPT | Performed by: OPTOMETRIST

## 2019-01-03 PROCEDURE — 1101F PT FALLS ASSESS-DOCD LE1/YR: CPT | Performed by: OPTOMETRIST

## 2019-01-03 PROCEDURE — G8427 DOCREV CUR MEDS BY ELIG CLIN: HCPCS | Performed by: OPTOMETRIST

## 2019-01-03 PROCEDURE — G8484 FLU IMMUNIZE NO ADMIN: HCPCS | Performed by: OPTOMETRIST

## 2019-01-03 PROCEDURE — 2022F DILAT RTA XM EVC RTNOPTHY: CPT | Performed by: OPTOMETRIST

## 2019-01-03 PROCEDURE — 3017F COLORECTAL CA SCREEN DOC REV: CPT | Performed by: OPTOMETRIST

## 2019-01-03 PROCEDURE — 4040F PNEUMOC VAC/ADMIN/RCVD: CPT | Performed by: OPTOMETRIST

## 2019-01-03 PROCEDURE — 3046F HEMOGLOBIN A1C LEVEL >9.0%: CPT | Performed by: OPTOMETRIST

## 2019-01-03 RX ORDER — PHENYLEPHRINE HCL 2.5 %
1 DROPS OPHTHALMIC (EYE) ONCE
Status: COMPLETED | OUTPATIENT
Start: 2019-01-03 | End: 2019-01-03

## 2019-01-03 RX ORDER — TROPICAMIDE 10 MG/ML
1 SOLUTION/ DROPS OPHTHALMIC ONCE
Status: COMPLETED | OUTPATIENT
Start: 2019-01-03 | End: 2019-01-03

## 2019-01-03 RX ADMIN — Medication 1 DROP: at 09:23

## 2019-01-03 RX ADMIN — TROPICAMIDE 1 DROP: 10 SOLUTION/ DROPS OPHTHALMIC at 09:24

## 2019-01-03 ASSESSMENT — SLIT LAMP EXAM - LIDS
COMMENTS: NORMAL
COMMENTS: NORMAL

## 2019-01-03 ASSESSMENT — VISUAL ACUITY
OD_SC+: -1
OD_SC: 20/30 OU
METHOD: SNELLEN - LINEAR
OD_SC: 20/30
OS_SC: 20/40

## 2019-01-03 ASSESSMENT — ENCOUNTER SYMPTOMS
ALLERGIC/IMMUNOLOGIC NEGATIVE: 0
EYES NEGATIVE: 1
GASTROINTESTINAL NEGATIVE: 0
RESPIRATORY NEGATIVE: 0

## 2019-01-03 ASSESSMENT — KERATOMETRY
OD_AXISANGLE2_DEGREES: 017
OD_AXISANGLE_DEGREES: 107
OD_K1POWER_DIOPTERS: 41.00
OD_K2POWER_DIOPTERS: 41.25

## 2019-01-03 ASSESSMENT — REFRACTION_MANIFEST
OS_AXIS: 088
OD_SPHERE: +0.75
OS_SPHERE: +1.00
OD_ADD: +2.50
OD_CYLINDER: -1.50
OD_AXIS: 075
OS_CYLINDER: -1.75
OS_ADD: +2.50

## 2019-01-03 ASSESSMENT — TONOMETRY
IOP_METHOD: NON-CONTACT AIR PUFF
OD_IOP_MMHG: 15
OS_IOP_MMHG: 16

## 2019-02-28 ENCOUNTER — OFFICE VISIT (OUTPATIENT)
Dept: FAMILY MEDICINE CLINIC | Age: 69
End: 2019-02-28
Payer: COMMERCIAL

## 2019-02-28 VITALS
SYSTOLIC BLOOD PRESSURE: 138 MMHG | TEMPERATURE: 96.7 F | BODY MASS INDEX: 25.75 KG/M2 | WEIGHT: 160.2 LBS | RESPIRATION RATE: 12 BRPM | DIASTOLIC BLOOD PRESSURE: 82 MMHG | HEIGHT: 66 IN | OXYGEN SATURATION: 98 % | HEART RATE: 72 BPM

## 2019-02-28 DIAGNOSIS — Z23 NEED FOR SHINGLES VACCINE: ICD-10-CM

## 2019-02-28 DIAGNOSIS — M79.641 BILATERAL HAND PAIN: ICD-10-CM

## 2019-02-28 DIAGNOSIS — Z79.4 TYPE 2 DIABETES MELLITUS WITH DIABETIC NEPHROPATHY, WITH LONG-TERM CURRENT USE OF INSULIN (HCC): Primary | ICD-10-CM

## 2019-02-28 DIAGNOSIS — Z12.12 SCREENING FOR COLORECTAL CANCER: ICD-10-CM

## 2019-02-28 DIAGNOSIS — E78.49 OTHER HYPERLIPIDEMIA: ICD-10-CM

## 2019-02-28 DIAGNOSIS — Z79.4 TYPE 2 DIABETES MELLITUS WITH STAGE 3 CHRONIC KIDNEY DISEASE, WITH LONG-TERM CURRENT USE OF INSULIN (HCC): ICD-10-CM

## 2019-02-28 DIAGNOSIS — Z23 NEED FOR PNEUMOCOCCAL VACCINATION: ICD-10-CM

## 2019-02-28 DIAGNOSIS — Z12.11 SCREENING FOR COLORECTAL CANCER: ICD-10-CM

## 2019-02-28 DIAGNOSIS — M79.642 BILATERAL HAND PAIN: ICD-10-CM

## 2019-02-28 DIAGNOSIS — N18.30 TYPE 2 DIABETES MELLITUS WITH STAGE 3 CHRONIC KIDNEY DISEASE, WITH LONG-TERM CURRENT USE OF INSULIN (HCC): ICD-10-CM

## 2019-02-28 DIAGNOSIS — E11.21 TYPE 2 DIABETES MELLITUS WITH DIABETIC NEPHROPATHY, WITH LONG-TERM CURRENT USE OF INSULIN (HCC): Primary | ICD-10-CM

## 2019-02-28 DIAGNOSIS — E11.22 TYPE 2 DIABETES MELLITUS WITH STAGE 3 CHRONIC KIDNEY DISEASE, WITH LONG-TERM CURRENT USE OF INSULIN (HCC): ICD-10-CM

## 2019-02-28 PROCEDURE — 2024F 7 FLD RTA PHOTO EVC RTNOPTHY: CPT | Performed by: NURSE PRACTITIONER

## 2019-02-28 PROCEDURE — 1101F PT FALLS ASSESS-DOCD LE1/YR: CPT | Performed by: NURSE PRACTITIONER

## 2019-02-28 PROCEDURE — 90670 PCV13 VACCINE IM: CPT | Performed by: NURSE PRACTITIONER

## 2019-02-28 PROCEDURE — 3046F HEMOGLOBIN A1C LEVEL >9.0%: CPT | Performed by: NURSE PRACTITIONER

## 2019-02-28 PROCEDURE — G8427 DOCREV CUR MEDS BY ELIG CLIN: HCPCS | Performed by: NURSE PRACTITIONER

## 2019-02-28 PROCEDURE — 1123F ACP DISCUSS/DSCN MKR DOCD: CPT | Performed by: NURSE PRACTITIONER

## 2019-02-28 PROCEDURE — 1036F TOBACCO NON-USER: CPT | Performed by: NURSE PRACTITIONER

## 2019-02-28 PROCEDURE — 90471 IMMUNIZATION ADMIN: CPT | Performed by: NURSE PRACTITIONER

## 2019-02-28 PROCEDURE — G8484 FLU IMMUNIZE NO ADMIN: HCPCS | Performed by: NURSE PRACTITIONER

## 2019-02-28 PROCEDURE — 3017F COLORECTAL CA SCREEN DOC REV: CPT | Performed by: NURSE PRACTITIONER

## 2019-02-28 PROCEDURE — G8419 CALC BMI OUT NRM PARAM NOF/U: HCPCS | Performed by: NURSE PRACTITIONER

## 2019-02-28 PROCEDURE — 99215 OFFICE O/P EST HI 40 MIN: CPT | Performed by: NURSE PRACTITIONER

## 2019-02-28 PROCEDURE — 4040F PNEUMOC VAC/ADMIN/RCVD: CPT | Performed by: NURSE PRACTITIONER

## 2019-02-28 RX ORDER — PREDNISONE 20 MG/1
20 TABLET ORAL 2 TIMES DAILY
Qty: 10 TABLET | Refills: 0 | Status: SHIPPED | OUTPATIENT
Start: 2019-02-28 | End: 2019-03-05

## 2019-02-28 RX ORDER — MELOXICAM 7.5 MG/1
7.5 TABLET ORAL DAILY PRN
Qty: 30 TABLET | Refills: 3 | Status: SHIPPED | OUTPATIENT
Start: 2019-03-06 | End: 2019-05-28 | Stop reason: ALTCHOICE

## 2019-02-28 RX ORDER — LISINOPRIL 5 MG/1
5 TABLET ORAL DAILY
Qty: 30 TABLET | Refills: 5 | Status: SHIPPED | OUTPATIENT
Start: 2019-02-28

## 2019-02-28 RX ORDER — GLIMEPIRIDE 2 MG/1
2 TABLET ORAL EVERY MORNING
Qty: 30 TABLET | Refills: 5 | Status: SHIPPED | OUTPATIENT
Start: 2019-02-28

## 2019-02-28 ASSESSMENT — PATIENT HEALTH QUESTIONNAIRE - PHQ9
SUM OF ALL RESPONSES TO PHQ QUESTIONS 1-9: 0
SUM OF ALL RESPONSES TO PHQ QUESTIONS 1-9: 0
2. FEELING DOWN, DEPRESSED OR HOPELESS: 0
SUM OF ALL RESPONSES TO PHQ9 QUESTIONS 1 & 2: 0
1. LITTLE INTEREST OR PLEASURE IN DOING THINGS: 0

## 2019-02-28 ASSESSMENT — ENCOUNTER SYMPTOMS
RESPIRATORY NEGATIVE: 1
GASTROINTESTINAL NEGATIVE: 1

## 2019-05-21 ENCOUNTER — HOSPITAL ENCOUNTER (OUTPATIENT)
Dept: LAB | Age: 69
Discharge: HOME | End: 2019-05-21
Payer: COMMERCIAL

## 2019-05-21 DIAGNOSIS — Z76.89 ENCOUNTER TO ESTABLISH CARE WITH NEW DOCTOR: ICD-10-CM

## 2019-05-21 DIAGNOSIS — Z79.4 TYPE 2 DIABETES MELLITUS WITH DIABETIC NEPHROPATHY, WITH LONG-TERM CURRENT USE OF INSULIN (HCC): ICD-10-CM

## 2019-05-21 DIAGNOSIS — N18.30 TYPE 2 DIABETES MELLITUS WITH STAGE 3 CHRONIC KIDNEY DISEASE, WITH LONG-TERM CURRENT USE OF INSULIN (HCC): ICD-10-CM

## 2019-05-21 DIAGNOSIS — E11.21 TYPE 2 DIABETES MELLITUS WITH DIABETIC NEPHROPATHY, WITH LONG-TERM CURRENT USE OF INSULIN (HCC): ICD-10-CM

## 2019-05-21 DIAGNOSIS — E11.22 TYPE 2 DIABETES MELLITUS WITH STAGE 3 CHRONIC KIDNEY DISEASE, WITH LONG-TERM CURRENT USE OF INSULIN (HCC): ICD-10-CM

## 2019-05-21 DIAGNOSIS — Z79.4 TYPE 2 DIABETES MELLITUS WITH STAGE 3 CHRONIC KIDNEY DISEASE, WITH LONG-TERM CURRENT USE OF INSULIN (HCC): ICD-10-CM

## 2019-05-21 DIAGNOSIS — E78.49 OTHER HYPERLIPIDEMIA: ICD-10-CM

## 2019-05-21 DIAGNOSIS — Z13.21 ENCOUNTER FOR VITAMIN DEFICIENCY SCREENING: ICD-10-CM

## 2019-05-21 LAB
ABSOLUTE EOS #: 0.1 K/UL (ref 0–0.4)
ABSOLUTE IMMATURE GRANULOCYTE: NORMAL K/UL (ref 0–0.3)
ABSOLUTE LYMPH #: 1.5 K/UL (ref 1–4.8)
ABSOLUTE MONO #: 0.6 K/UL (ref 0.1–1.2)
ALBUMIN SERPL-MCNC: 4.1 G/DL (ref 3.5–5.2)
ALBUMIN/GLOBULIN RATIO: 1.2 (ref 1–2.5)
ALP BLD-CCNC: 117 U/L (ref 40–129)
ALT SERPL-CCNC: 23 U/L (ref 5–41)
ANION GAP SERPL CALCULATED.3IONS-SCNC: 11 MMOL/L (ref 9–17)
AST SERPL-CCNC: 17 U/L
BASOPHILS # BLD: 0 % (ref 0–2)
BASOPHILS ABSOLUTE: 0 K/UL (ref 0–0.2)
BILIRUB SERPL-MCNC: 1.25 MG/DL (ref 0.3–1.2)
BUN BLDV-MCNC: 18 MG/DL (ref 8–23)
BUN/CREAT BLD: 17 (ref 9–20)
CALCIUM SERPL-MCNC: 9.5 MG/DL (ref 8.6–10.4)
CHLORIDE BLD-SCNC: 105 MMOL/L (ref 98–107)
CHOLESTEROL/HDL RATIO: 4.7
CHOLESTEROL: 189 MG/DL
CO2: 25 MMOL/L (ref 20–31)
CREAT SERPL-MCNC: 1.09 MG/DL (ref 0.7–1.2)
DIFFERENTIAL TYPE: NORMAL
EOSINOPHILS RELATIVE PERCENT: 2 % (ref 1–8)
ESTIMATED AVERAGE GLUCOSE: 154 MG/DL
GFR AFRICAN AMERICAN: >60 ML/MIN
GFR NON-AFRICAN AMERICAN: >60 ML/MIN
GFR SERPL CREATININE-BSD FRML MDRD: ABNORMAL ML/MIN/{1.73_M2}
GFR SERPL CREATININE-BSD FRML MDRD: ABNORMAL ML/MIN/{1.73_M2}
GLUCOSE BLD-MCNC: 71 MG/DL (ref 70–99)
HBA1C MFR BLD: 7 % (ref 4.8–5.9)
HCT VFR BLD CALC: 44.9 % (ref 41–53)
HDLC SERPL-MCNC: 40 MG/DL
HEMOGLOBIN: 15 G/DL (ref 13.5–17.5)
IMMATURE GRANULOCYTES: NORMAL %
LDL CHOLESTEROL: 129 MG/DL (ref 0–130)
LYMPHOCYTES # BLD: 23 % (ref 15–43)
MCH RBC QN AUTO: 30.6 PG (ref 26–34)
MCHC RBC AUTO-ENTMCNC: 33.4 G/DL (ref 31–37)
MCV RBC AUTO: 91.9 FL (ref 80–100)
MONOCYTES # BLD: 9 % (ref 6–14)
NRBC AUTOMATED: NORMAL PER 100 WBC
PDW BLD-RTO: 13.3 % (ref 11–14.5)
PLATELET # BLD: 196 K/UL (ref 140–450)
PLATELET ESTIMATE: NORMAL
PMV BLD AUTO: 9.9 FL (ref 6–12)
POTASSIUM SERPL-SCNC: 3.9 MMOL/L (ref 3.7–5.3)
RBC # BLD: 4.88 M/UL (ref 4.5–5.9)
RBC # BLD: NORMAL 10*6/UL
SEG NEUTROPHILS: 66 % (ref 44–74)
SEGMENTED NEUTROPHILS ABSOLUTE COUNT: 4.5 K/UL (ref 1.8–7.7)
SODIUM BLD-SCNC: 141 MMOL/L (ref 135–144)
TOTAL PROTEIN: 7.6 G/DL (ref 6.4–8.3)
TRIGL SERPL-MCNC: 101 MG/DL
VITAMIN D 25-HYDROXY: 18.6 NG/ML (ref 30–100)
VLDLC SERPL CALC-MCNC: ABNORMAL MG/DL (ref 1–30)
WBC # BLD: 6.8 K/UL (ref 3.5–11)
WBC # BLD: NORMAL 10*3/UL

## 2019-05-21 PROCEDURE — 83036 HEMOGLOBIN GLYCOSYLATED A1C: CPT

## 2019-05-21 PROCEDURE — 36415 COLL VENOUS BLD VENIPUNCTURE: CPT

## 2019-05-21 PROCEDURE — 80061 LIPID PANEL: CPT

## 2019-05-21 PROCEDURE — 80053 COMPREHEN METABOLIC PANEL: CPT

## 2019-05-21 PROCEDURE — 82306 VITAMIN D 25 HYDROXY: CPT

## 2019-05-21 PROCEDURE — 85025 COMPLETE CBC W/AUTO DIFF WBC: CPT

## 2019-05-23 DIAGNOSIS — E55.9 VITAMIN D DEFICIENCY: Primary | ICD-10-CM

## 2019-05-28 ENCOUNTER — OFFICE VISIT (OUTPATIENT)
Dept: FAMILY MEDICINE CLINIC | Age: 69
End: 2019-05-28
Payer: COMMERCIAL

## 2019-05-28 VITALS
DIASTOLIC BLOOD PRESSURE: 76 MMHG | OXYGEN SATURATION: 98 % | RESPIRATION RATE: 12 BRPM | TEMPERATURE: 96.4 F | HEIGHT: 66 IN | WEIGHT: 161.2 LBS | HEART RATE: 64 BPM | BODY MASS INDEX: 25.91 KG/M2 | SYSTOLIC BLOOD PRESSURE: 122 MMHG

## 2019-05-28 DIAGNOSIS — E11.21 TYPE 2 DIABETES MELLITUS WITH DIABETIC NEPHROPATHY, WITH LONG-TERM CURRENT USE OF INSULIN (HCC): ICD-10-CM

## 2019-05-28 DIAGNOSIS — E55.9 VITAMIN D DEFICIENCY: ICD-10-CM

## 2019-05-28 DIAGNOSIS — M79.642 BILATERAL HAND PAIN: ICD-10-CM

## 2019-05-28 DIAGNOSIS — M79.641 BILATERAL HAND PAIN: ICD-10-CM

## 2019-05-28 DIAGNOSIS — E78.2 MIXED HYPERLIPIDEMIA: Primary | ICD-10-CM

## 2019-05-28 DIAGNOSIS — Z79.4 TYPE 2 DIABETES MELLITUS WITH DIABETIC NEPHROPATHY, WITH LONG-TERM CURRENT USE OF INSULIN (HCC): ICD-10-CM

## 2019-05-28 PROCEDURE — 3045F PR MOST RECENT HEMOGLOBIN A1C LEVEL 7.0-9.0%: CPT | Performed by: NURSE PRACTITIONER

## 2019-05-28 PROCEDURE — 4040F PNEUMOC VAC/ADMIN/RCVD: CPT | Performed by: NURSE PRACTITIONER

## 2019-05-28 PROCEDURE — G8427 DOCREV CUR MEDS BY ELIG CLIN: HCPCS | Performed by: NURSE PRACTITIONER

## 2019-05-28 PROCEDURE — 3017F COLORECTAL CA SCREEN DOC REV: CPT | Performed by: NURSE PRACTITIONER

## 2019-05-28 PROCEDURE — 1036F TOBACCO NON-USER: CPT | Performed by: NURSE PRACTITIONER

## 2019-05-28 PROCEDURE — 2024F 7 FLD RTA PHOTO EVC RTNOPTHY: CPT | Performed by: NURSE PRACTITIONER

## 2019-05-28 PROCEDURE — G8419 CALC BMI OUT NRM PARAM NOF/U: HCPCS | Performed by: NURSE PRACTITIONER

## 2019-05-28 PROCEDURE — 99215 OFFICE O/P EST HI 40 MIN: CPT | Performed by: NURSE PRACTITIONER

## 2019-05-28 PROCEDURE — 1123F ACP DISCUSS/DSCN MKR DOCD: CPT | Performed by: NURSE PRACTITIONER

## 2019-05-28 RX ORDER — ERGOCALCIFEROL (VITAMIN D2) 1250 MCG
50000 CAPSULE ORAL WEEKLY
Qty: 12 CAPSULE | Refills: 0 | Status: SHIPPED | OUTPATIENT
Start: 2019-05-28 | End: 2019-08-14

## 2019-05-28 ASSESSMENT — PATIENT HEALTH QUESTIONNAIRE - PHQ9
1. LITTLE INTEREST OR PLEASURE IN DOING THINGS: 0
2. FEELING DOWN, DEPRESSED OR HOPELESS: 0
SUM OF ALL RESPONSES TO PHQ9 QUESTIONS 1 & 2: 0
SUM OF ALL RESPONSES TO PHQ QUESTIONS 1-9: 0
SUM OF ALL RESPONSES TO PHQ QUESTIONS 1-9: 0

## 2019-05-28 ASSESSMENT — ENCOUNTER SYMPTOMS
RESPIRATORY NEGATIVE: 1
GASTROINTESTINAL NEGATIVE: 1

## 2019-05-28 NOTE — PROGRESS NOTES
Jake received counseling on the following healthy behaviors: exercise  Reviewed prior labs and health maintenance  Continue current medications except where noted below, diet and exercise. Discussed use, benefit, and side effects of prescribed medications. Barriers to medication compliance addressed. Patient given educational materials - see patient instructions  Was a self-tracking handout given in paper form or via [employer]? Yes    Requested Prescriptions      No prescriptions requested or ordered in this encounter       All patient questions answered. Patient voiced understanding. Quality Measures    Body mass index is 25.63 kg/m². Elevated. Weight control planned discussed: healthy diet and regular exercise. . Blood pressure is normal. Treatment plan consists of: see progress note below. Fall Risk 2/28/2019 7/18/2018   2 or more falls in past year? no no   Fall with injury in past year? no no     The patient does not have a history of falls. I did not - not indicated , complete a risk assessment for falls.  A plan of care for falls No Treatment plan indicated    Lab Results   Component Value Date    LDLCHOLESTEROL 129 05/21/2019    (goal LDL reduction with dx if diabetes is 50% LDL reduction)    PHQ Scores 5/28/2019 2/28/2019 11/27/2018   PHQ2 Score 0 0 0   PHQ9 Score 0 0 0     Interpretation of Total Score Depression Severity: 1-4 = Minimal depression, 5-9 = Mild depression, 10-14 = Moderate depression, 15-19 = Moderately severe depression, 20-27 = Severe depression

## 2019-05-28 NOTE — PROGRESS NOTES
factors for coronary artery disease include diabetes mellitus, dyslipidemia and male sex. Hand Pain    The incident occurred more than 1 week ago. The injury mechanism was repetitive motion. The pain is present in the left hand, right hand, right wrist and left wrist. The quality of the pain is described as stabbing, shooting and cramping. Radiates to: to both forearms, with  numbness and tingling. The pain is at a severity of 5/10. The pain is moderate. Pertinent negatives include no numbness or tingling. He has tried NSAIDs for the symptoms. The treatment provided no relief. Past Medical History:   Diagnosis Date    Depression     ED (erectile dysfunction)     Elevated PSA     ESBL (extended spectrum beta-lactamase) producing bacteria infection 3/14/2014    blood and urine E. Coli    Hyperlipidemia     Hypogonadism male     Low testosterone     hx of    Mycosis     mild, feet    Proteinuria     mild    Type II or unspecified type diabetes mellitus without mention of complication, not stated as uncontrolled        Past Surgical History:   Procedure Laterality Date    [facility]GERY [facility]  PICC POWERPIC SINGLE  3/19/2014         PROSTATE BIOPSY         Family History   Problem Relation Age of Onset    Cataracts Neg Hx     Diabetes Neg Hx     Glaucoma Neg Hx         No Known Allergies    Current Outpatient Medications   Medication Sig Dispense Refill    blood glucose test strips (ASCENSIA AUTODISC VI;ONE TOUCH ULTRA TEST VI) strip As needed.  100 strip 3    ergocalciferol (ERGOCALCIFEROL) 48349 units capsule Take 1 capsule by mouth once a week for 12 doses 12 capsule 0    diclofenac (VOLTAREN) 50 MG EC tablet Take 1 tablet by mouth 3 times daily (with meals) 60 tablet 3    lisinopril (PRINIVIL;ZESTRIL) 5 MG tablet Take 1 tablet by mouth daily 30 tablet 5    insulin glargine (LANTUS SOLOSTAR) 100 UNIT/ML injection pen Inject 20 Units into the skin nightly 4 pen 5    glimepiride (AMARYL) 2 MG tablet Take 1 tablet by mouth every morning 30 tablet 5    metFORMIN (GLUCOPHAGE) 500 MG tablet Take 1 tablet by mouth 2 times daily (with meals) 60 tablet 5    Insulin Pen Needle 31G X 6 MM MISC 1 each by Does not apply route daily 100 each 3    ACCU-CHEK SOFTCLIX LANCETS MISC Use as needed. Please give patient whichever brand his insurance covers. 100 each 3     No current facility-administered medications for this visit. Review of Systems   Constitutional: Negative. HENT: Negative. Respiratory: Negative. Cardiovascular: Negative. Gastrointestinal: Negative. Neurological: Negative. Negative for tingling and numbness. Psychiatric/Behavioral: Negative. Objective:       /76 (Site: Right Upper Arm, Position: Sitting, Cuff Size: Medium Adult)   Pulse 64   Temp 96.4 °F (35.8 °C) (Tympanic)   Resp 12   Ht 5' 6.5\" (1.689 m)   Wt 161 lb 3.2 oz (73.1 kg)   SpO2 98%   BMI 25.63 kg/m²     Physical Exam   Constitutional: He is oriented to person, place, and time. Vital signs are normal. He appears well-developed and well-nourished. He is cooperative. HENT:   Head: Normocephalic and atraumatic. Right Ear: External ear normal.   Left Ear: External ear normal.   Nose: Nose normal.   Mouth/Throat: Oropharynx is clear and moist.   Eyes: Pupils are equal, round, and reactive to light. Conjunctivae and EOM are normal.   Neck: Normal range of motion. Cardiovascular: Normal rate and regular rhythm. Pulmonary/Chest: Effort normal and breath sounds normal.   Abdominal: Soft. Musculoskeletal: Normal range of motion. Neurological: He is alert and oriented to person, place, and time. Skin: Skin is warm and dry. Psychiatric: He has a normal mood and affect. His behavior is normal. Judgment and thought content normal.   Nursing note and vitals reviewed.     Hospital Outpatient Visit on 05/21/2019   Component Date Value Ref Range Status    Vit D, 25-Hydroxy 05/21/2019 18.6* 30.0 - 100.0 ng/mL Final    Comment:    Reference Range:  Vitamin D status         Range   Deficiency              <20 ng/mL   Mild Deficiency       20-30 ng/mL   Sufficiency           ng/mL   Toxicity               >100 ng/mL      Glucose 05/21/2019 71  70 - 99 mg/dL Final    BUN 05/21/2019 18  8 - 23 mg/dL Final    CREATININE 05/21/2019 1.09  0.70 - 1.20 mg/dL Final    Bun/Cre Ratio 05/21/2019 17  9 - 20 Final    Calcium 05/21/2019 9.5  8.6 - 10.4 mg/dL Final    Sodium 05/21/2019 141  135 - 144 mmol/L Final    Potassium 05/21/2019 3.9  3.7 - 5.3 mmol/L Final    Chloride 05/21/2019 105  98 - 107 mmol/L Final    CO2 05/21/2019 25  20 - 31 mmol/L Final    Anion Gap 05/21/2019 11  9 - 17 mmol/L Final    Alkaline Phosphatase 05/21/2019 117  40 - 129 U/L Final    ALT 05/21/2019 23  5 - 41 U/L Final    AST 05/21/2019 17  <40 U/L Final    Total Bilirubin 05/21/2019 1.25* 0.3 - 1.2 mg/dL Final    Total Protein 05/21/2019 7.6  6.4 - 8.3 g/dL Final    Alb 05/21/2019 4.1  3.5 - 5.2 g/dL Final    Albumin/Globulin Ratio 05/21/2019 1.2  1.0 - 2.5 Final    GFR Non- 05/21/2019 >60  >60 mL/min Final    GFR  05/21/2019 >60  >60 mL/min Final    GFR Comment 05/21/2019        Final    Comment: Average GFR for 61-76 years old:   80 mL/min/1.73sq m  Chronic Kidney Disease:   <60 mL/min/1.73sq m  Kidney failure:   <15 mL/min/1.73sq m              eGFR calculated using average adult body mass.  Additional eGFR calculator available at:        [email or web address]            GFR Staging 05/21/2019 NOT REPORTED   Final    WBC 05/21/2019 6.8  3.5 - 11.0 k/uL Final    RBC 05/21/2019 4.88  4.5 - 5.9 m/uL Final    Hemoglobin 05/21/2019 15.0  13.5 - 17.5 g/dL Final    Hematocrit 05/21/2019 44.9  41 - 53 % Final    MCV 05/21/2019 91.9  80 - 100 fL Final    MCH 05/21/2019 30.6  26 - 34 pg Final    MCHC 05/21/2019 33.4  31 - 37 g/dL Final    >499   Very high   Based on AHA Guidelines for fasting triglyceride, October 2012.  VLDL 05/21/2019 NOT REPORTED  1 - 30 mg/dL Final         Assessment & Plan:      1. Type 2 diabetes mellitus with diabetic nephropathy, with long-term current use of insulin (HCC)-chronic controlled with medication    - blood glucose test strips (ASCENSIA AUTODISC VI;ONE TOUCH ULTRA TEST VI) strip; As needed. Dispense: 100 strip; Refill: 3    2. Mixed hyperlipidemia-chronic controlled with medication      3. Vitamin D deficiency-unstable    - ergocalciferol (ERGOCALCIFEROL) 80064 units capsule; Take 1 capsule by mouth once a week for 12 doses  Dispense: 12 capsule; Refill: 0    4. Bilateral hand pain-chronic same  Trial new medication. Patient declines PT or EMG testing at this time  - diclofenac (VOLTAREN) 50 MG EC tablet; Take 1 tablet by mouth 3 times daily (with meals)  Dispense: 60 tablet; Refill: 3    Answered all of the patient's questions. Agrees with plan of care. Follow up in 6 months or sooner if needed. Patient was seen with total face to face time of 50 minutes. More than 50% of this visit was counseling and education regarding treatment plan and communication via .          ELEUTERIO Márquez - CNP   5/28/2019 9:19 AM

## 2019-07-08 DIAGNOSIS — Z12.11 SCREENING FOR COLORECTAL CANCER: ICD-10-CM

## 2019-07-08 DIAGNOSIS — Z12.12 SCREENING FOR COLORECTAL CANCER: ICD-10-CM

## 2019-12-17 NOTE — PATIENT INSTRUCTIONS
Patient escorted to exam room in stable condition. Name, birthdate and allergies verified with patient.        alto de grasa puede provocar lora acumulación en los vasos sanguíneos y reducir el flujo de [place]. · Inspeccione juan pies a diario para ned si tienen ampollas, cortaduras, grietas o llagas. Si no puede ned sia, utilice un juno o pídale a alguien [facility]. · Cuídese los pies:  ¨ Lávese los pies todos los días. Use agua tibia (no caliente). Revise la temperatura del agua con dasilva ольга u otra parte del cuerpo, no con juan pies. ¨ Seque sia juan pies. Séquelos con toques suaves de toalla. No frote demasiado la piel de los pies. Seque sia entre los dedos de los pies. Si la piel de los pies [place], pueden crecer bacterias u hongos, que pueden provocar lora infección. ¨ Mantenga dasilva piel suave. Use crema humectante para la piel para mantener la piel de los pies [place] y prevenir callosidades y grietas. [place] no se ponga crema entre los dedos de los pies, y deje el uso de cualquier crema que le cause salpullido. ¨ Limpie con cuidado debajo de las [employer]. No utilice objetos cortantes para limpiar debajo de las uñas de los pies. Use el extremo sin shaun de lora lima para uñas u otro instrumento redondeado. ¨ Recorte y lime las uñas de los pies en forma recta para prevenir las uñas encarnadas (enterradas). Use un cortaúñas, no tijeras. Use lora lima de esmeril para [employer]. · Cámbiese los calcetines a diario. Las medias sin costura son las mejores, porque las costuras suelen rozar los pies. Puede encontrar calcetines en catálogos especializados para personas con diabetes. · Revise juan zapatos todos los días en busca de cosas salome piedrecillas o revestimiento rasgado del zapato, que le puedan causar ampollas o llagas. · Cómprese zapatos que le queden sia:  ¨ Busque zapatos que tengan suficiente espacio alrededor [employer] dedos. [place] ayuda a prevenir juanetes y ampollas. ¨ Pruébese los zapatos con el tipo de calcetín que usará de manera habitual con ellos. ¨ Evite los zapatos plásticos.  Éstos